# Patient Record
Sex: MALE | Race: WHITE | NOT HISPANIC OR LATINO
[De-identification: names, ages, dates, MRNs, and addresses within clinical notes are randomized per-mention and may not be internally consistent; named-entity substitution may affect disease eponyms.]

---

## 2018-11-12 ENCOUNTER — RECORD ABSTRACTING (OUTPATIENT)
Age: 43
End: 2018-11-12

## 2018-11-12 DIAGNOSIS — Z83.71 FAMILY HISTORY OF COLONIC POLYPS: ICD-10-CM

## 2018-11-12 DIAGNOSIS — Z82.49 FAMILY HISTORY OF ISCHEMIC HEART DISEASE AND OTHER DISEASES OF THE CIRCULATORY SYSTEM: ICD-10-CM

## 2018-11-12 DIAGNOSIS — Z86.010 PERSONAL HISTORY OF COLONIC POLYPS: ICD-10-CM

## 2018-11-12 DIAGNOSIS — Z83.3 FAMILY HISTORY OF DIABETES MELLITUS: ICD-10-CM

## 2018-11-12 DIAGNOSIS — Z87.891 PERSONAL HISTORY OF NICOTINE DEPENDENCE: ICD-10-CM

## 2018-11-12 PROBLEM — Z00.00 ENCOUNTER FOR PREVENTIVE HEALTH EXAMINATION: Status: ACTIVE | Noted: 2018-11-12

## 2018-12-18 ENCOUNTER — APPOINTMENT (OUTPATIENT)
Dept: CARDIOLOGY | Facility: CLINIC | Age: 43
End: 2018-12-18
Payer: COMMERCIAL

## 2018-12-18 ENCOUNTER — NON-APPOINTMENT (OUTPATIENT)
Age: 43
End: 2018-12-18

## 2018-12-18 VITALS
BODY MASS INDEX: 27.9 KG/M2 | DIASTOLIC BLOOD PRESSURE: 80 MMHG | HEART RATE: 50 BPM | HEIGHT: 72 IN | SYSTOLIC BLOOD PRESSURE: 125 MMHG | WEIGHT: 206 LBS

## 2018-12-18 PROCEDURE — 93000 ELECTROCARDIOGRAM COMPLETE: CPT

## 2018-12-18 PROCEDURE — 99214 OFFICE O/P EST MOD 30 MIN: CPT

## 2018-12-18 NOTE — HISTORY OF PRESENT ILLNESS
[FreeTextEntry1] : Scooter was first seen in 1997 for paroxysmal atrial fibrillation, ultimately had ablation of AVNRT  july 2013 by Dr Oliver.Since our visit last year he has not been hospitalized, he did have one urgent care visit for a cold. He denies chest pain, dyspnea at rest, palpitations or syncope there is dyspnea with "hard" exertion. He exercises 3-4 times per week. normal...

## 2020-01-09 ENCOUNTER — APPOINTMENT (OUTPATIENT)
Dept: CARDIOLOGY | Facility: CLINIC | Age: 45
End: 2020-01-09
Payer: COMMERCIAL

## 2020-01-09 ENCOUNTER — NON-APPOINTMENT (OUTPATIENT)
Age: 45
End: 2020-01-09

## 2020-01-09 ENCOUNTER — OTHER (OUTPATIENT)
Age: 45
End: 2020-01-09

## 2020-01-09 VITALS
DIASTOLIC BLOOD PRESSURE: 90 MMHG | BODY MASS INDEX: 28.31 KG/M2 | SYSTOLIC BLOOD PRESSURE: 120 MMHG | HEIGHT: 72 IN | WEIGHT: 209 LBS | HEART RATE: 49 BPM

## 2020-01-09 VITALS — SYSTOLIC BLOOD PRESSURE: 130 MMHG | DIASTOLIC BLOOD PRESSURE: 100 MMHG

## 2020-01-09 DIAGNOSIS — Z82.49 FAMILY HISTORY OF ISCHEMIC HEART DISEASE AND OTHER DISEASES OF THE CIRCULATORY SYSTEM: ICD-10-CM

## 2020-01-09 PROCEDURE — 93000 ELECTROCARDIOGRAM COMPLETE: CPT

## 2020-01-09 PROCEDURE — 99215 OFFICE O/P EST HI 40 MIN: CPT

## 2020-01-09 NOTE — HISTORY OF PRESENT ILLNESS
[FreeTextEntry1] : Scooter was first seen in 1997 for paroxysmal atrial fibrillation, ultimately had ablation of AVNRT and cardioversion of afib during the procedure   july 2013 by Dr Oliver.\par If he has "a couple of drinks" and walks upstairs he feels. short of breath. When he walks the dog he "gets a head rush".\par He does the stairmaster and walks, elliptical and bike  4-5  times /week  without difficulty.\par He denies chest pain,, palpitations or syncope.\par He denies Pnd or orthopnea.\par

## 2020-06-18 ENCOUNTER — RESULT REVIEW (OUTPATIENT)
Age: 45
End: 2020-06-18

## 2020-06-23 ENCOUNTER — APPOINTMENT (OUTPATIENT)
Dept: CARDIOLOGY | Facility: CLINIC | Age: 45
End: 2020-06-23
Payer: COMMERCIAL

## 2020-06-23 PROCEDURE — 99214 OFFICE O/P EST MOD 30 MIN: CPT | Mod: 95

## 2020-06-23 NOTE — REASON FOR VISIT
[Follow-Up - Clinic] : a clinic follow-up of [Atrial Fibrillation] : atrial fibrillation [FreeTextEntry2] : 6 months [FreeTextEntry1] : dimple

## 2020-06-23 NOTE — HISTORY OF PRESENT ILLNESS
[Home] : at home, [unfilled] , at the time of the visit. [Other Location: e.g. Home (Enter Location, City,State)___] : at [unfilled] [Spouse] : spouse [Verbal consent obtained from patient] : the patient, [unfilled] [FreeTextEntry1] : Scooter was first seen in 1997 for paroxysmal atrial fibrillation, ultimately had ablation of AVNRT and cardioversion of afib during the procedure   july 2013 by Dr Oliver.\par His echo showed harley, MRI confirms HOCM.\par He denies chest pain, dyspnea, palpitations or syncope.\par During the pandemic he has been exercising more and eating better  and has lost weight.

## 2020-06-29 ENCOUNTER — RESULT REVIEW (OUTPATIENT)
Age: 45
End: 2020-06-29

## 2020-06-30 ENCOUNTER — LABORATORY RESULT (OUTPATIENT)
Age: 45
End: 2020-06-30

## 2020-07-02 LAB
SARS-COV-2 IGG SERPL IA-ACNC: <0.1 INDEX
SARS-COV-2 IGG SERPL QL IA: NEGATIVE

## 2020-12-17 ENCOUNTER — RESULT CHARGE (OUTPATIENT)
Age: 45
End: 2020-12-17

## 2020-12-18 ENCOUNTER — APPOINTMENT (OUTPATIENT)
Dept: CARDIOLOGY | Facility: CLINIC | Age: 45
End: 2020-12-18
Payer: COMMERCIAL

## 2020-12-18 VITALS
HEIGHT: 72 IN | SYSTOLIC BLOOD PRESSURE: 120 MMHG | DIASTOLIC BLOOD PRESSURE: 80 MMHG | WEIGHT: 180 LBS | BODY MASS INDEX: 24.38 KG/M2 | HEART RATE: 50 BPM

## 2020-12-18 PROCEDURE — 99214 OFFICE O/P EST MOD 30 MIN: CPT

## 2020-12-18 PROCEDURE — 93000 ELECTROCARDIOGRAM COMPLETE: CPT

## 2020-12-18 PROCEDURE — 99072 ADDL SUPL MATRL&STAF TM PHE: CPT

## 2020-12-18 NOTE — HISTORY OF PRESENT ILLNESS
[FreeTextEntry1] : Scooter was first seen in 1997 for paroxysmal atrial fibrillation, ultimately had ablation of AVNRT and cardioversion of afib during the procedure   july 2013 by Dr Oliver.\par His echo showed harley, MRI confirms HOCM.\par He denies chest pain, dyspnea, palpitations or syncope.\par During the pandemic he has been exercising more and eating better  and has lost weight.

## 2020-12-18 NOTE — CARDIOLOGY SUMMARY
[No Ischemia] : no Ischemia [LVEF ___%] : LVEF [unfilled]% [___] : [unfilled] [Enlarged] : enlarged LA size [___] : [unfilled]

## 2021-12-18 ENCOUNTER — NON-APPOINTMENT (OUTPATIENT)
Age: 46
End: 2021-12-18

## 2021-12-20 ENCOUNTER — APPOINTMENT (OUTPATIENT)
Dept: CARDIOLOGY | Facility: CLINIC | Age: 46
End: 2021-12-20
Payer: COMMERCIAL

## 2021-12-20 VITALS
OXYGEN SATURATION: 98 % | WEIGHT: 178 LBS | SYSTOLIC BLOOD PRESSURE: 138 MMHG | DIASTOLIC BLOOD PRESSURE: 85 MMHG | HEIGHT: 72 IN | BODY MASS INDEX: 24.11 KG/M2 | HEART RATE: 54 BPM | TEMPERATURE: 97.7 F

## 2021-12-20 VITALS — SYSTOLIC BLOOD PRESSURE: 120 MMHG | DIASTOLIC BLOOD PRESSURE: 80 MMHG

## 2021-12-20 PROCEDURE — 99214 OFFICE O/P EST MOD 30 MIN: CPT

## 2021-12-20 PROCEDURE — 93000 ELECTROCARDIOGRAM COMPLETE: CPT

## 2021-12-20 NOTE — HISTORY OF PRESENT ILLNESS
[FreeTextEntry1] : Scooter was first seen in 1997 for paroxysmal atrial fibrillation, ultimately had ablation of AVNRT and cardioversion of afib during the procedure   july 2013 by Dr Oliver.\par His echo showed harley, MRI confirms HOCM.\par \par He had one er visit for a wrist injury.\par \par He denies chest pain, dyspnea, palpitations or syncope.\par He exercises 6-7 times a week, indoor bike, weightlifting and elliptical, without difficulty.\par He has had  some work stress.

## 2022-04-18 ENCOUNTER — NON-APPOINTMENT (OUTPATIENT)
Age: 47
End: 2022-04-18

## 2022-04-18 DIAGNOSIS — Z82.49 FAMILY HISTORY OF ISCHEMIC HEART DISEASE AND OTHER DISEASES OF THE CIRCULATORY SYSTEM: ICD-10-CM

## 2022-04-18 DIAGNOSIS — Z80.51 FAMILY HISTORY OF MALIGNANT NEOPLASM OF KIDNEY: ICD-10-CM

## 2022-05-09 ENCOUNTER — APPOINTMENT (OUTPATIENT)
Dept: CARDIOLOGY | Facility: CLINIC | Age: 47
End: 2022-05-09
Payer: COMMERCIAL

## 2022-05-09 PROCEDURE — 93306 TTE W/DOPPLER COMPLETE: CPT

## 2022-05-17 ENCOUNTER — APPOINTMENT (OUTPATIENT)
Dept: GASTROENTEROLOGY | Facility: CLINIC | Age: 47
End: 2022-05-17
Payer: COMMERCIAL

## 2022-05-17 VITALS
DIASTOLIC BLOOD PRESSURE: 78 MMHG | BODY MASS INDEX: 24.24 KG/M2 | SYSTOLIC BLOOD PRESSURE: 132 MMHG | HEART RATE: 76 BPM | WEIGHT: 179 LBS | HEIGHT: 72 IN

## 2022-05-17 PROCEDURE — 99204 OFFICE O/P NEW MOD 45 MIN: CPT

## 2022-05-17 NOTE — ASSESSMENT
[FreeTextEntry1] : \par \par 1. Hemorrhoids:  well - controlled.  No pain,  swelling,  itch,  bleeding\par * Discussed   the  potential complications of thrombosis,  pain,  infection,  swelling, itching,  bleeding --none recently\par Recommendations: \par * Moderate- High  Fiber Diet was reviewed and emphasized\par * 6  --  8 cups of decaffeinated fluid daily was emphasized \par * Sitz Bathes as needed ,  No:  Anusol HC  Suppos / Cream  TN BID -- was needed\par * No:  Tucks BID,  Balneol Lotion,   Calmoseptine Oint -- was needed ;    can use  Prep H prn\par * No:  need for  Colorectal surgical evaluation for possible ablation \par \par \par \par \par \par \par 2. Constipation:   No pain,  constipation,  diarrhea,  bloat\par Recommend: \par * Diet: moderate -High Fiber,  Low Fat & Lactose free, Low FODMAPs--was reviewed & emphasized\par * Daily fluid intake was reviewed : 6  --  8 cups of decaffeinated fluid daily was emphasized\par * Regular aerobic exercise was emphasized\par * Probiotics  1  daily\par * Miralax 1/2 cap  prn \par * Neuromodulation:  not currently required\par \par \par \par \par \par 3. Colorectal   Neoplasia  Screening:  to be evaluated\par   Utilizing teaching posters and anatomical models the following were discussed and emphasized with the patient in detail: \par * Discussed the pre-malignant potential of polyps\par * Discussed the importance of f/u surveillance / screening colonoscopy \par * moderate-High  Fiber Diet was reviewed and emphasized\par * Anti-oxidants and ASA/NSAID Therapy emphasized\par * Given age > 40-49 yo & +PH/+FH Colon Polyps \par * Recommend Colonoscopy  to  R/O  Colonic Neoplasia-- in 2022\par \par \par \par \par \par Informed Consent:\par * The risks & Benefits of   Colonoscopy were discussed w patient.\par * This included but was not limited to perforation, bleeding, sedation /med rxns possibly requiring surgery, blood transfusions, antibiotics & CPR/Intubation.\par * Pt. understands & agrees to the procedures.\par The following instructions in regards to the prep and medically essential ( cardiac, pulmonary, sz, psych, endocrine)  pre-op medication administration\par was reviewed and emphasized with the patient . \par * Pt. advised to D/C  ASA/NSAIDs  7  Days  PTP.\par * [ +++ ]  Dulcolax / Miralax / Mag. Citrate ,  [     ] Prepopik/ Clenpiq ,  [     ] Osmo Prep,  [    ] GoLytely,  prep. reviewed w Pt.\par * Hold  [           ] AM of procedure.\par * Hold  [           ] PM  before procedure.\par * Take  [           ] PM  before procedure.\par * Take  [           ] AM of procedure.\par \par

## 2022-05-17 NOTE — HISTORY OF PRESENT ILLNESS
[de-identified] : \par \par This HPI  reflects a summary and review of records : including previous and most recent  Labs, body imaging, consults and progress notes, operative and pathology reports, EKG reports, ED records, found in [facility], [facility],  [facility] and any additional records brought in by  the patient at the time of the visit.\par \par \par PCP: January\par \par 45 yo M w h/o HOCM, AFIB/SVT --s/p ablation, HTN, HLD, \par + H/O Snoring,  BMI=24   ,   neck= 17  ,  STOP-Bang= 3    ,  Mallaampatti=4\par Colon Polyps, +FH Colon Polyps,Hemorrhoids\par \par 5/3/22    Today:  Feeling well, no c/o , CP, SOB/ DIAMOND, Cough, Wheeze, Palpitations, edema\par \par   Today: doing well, eating HF diet, rare constipation\par              No recent bleeding \par \par * Abd pain-->no\par * Nausea--> no\par * Vomit--> no\par * Early satiety--> no\par * Belching--> no\par * Hiccups--> no\par * Regurgitation--> no\par * Acid Taste / Water Brash--> no\par * Ht burn--> no\par * Dysphagia--> no\par * Throat Clearing--> no\par * Hoarseness--> no\par * Post-Nasal Drip--> no\par * Congestion--> no\par * Globus--> no\par * Cough--> no\par * Wheeze / PC-> -no\par * BMs: # 4 qd\par * Constipation--> no\par * Diarrhea--> no\par * Bloating--> no\par * Strain on Defecation--> no\par * Incompl Evac--> no\par * Flatulence--> no\par * Gurgling--> no\par * Melena--> no\par * BPBPR-> -no\par * Anorexia--> no\par * Wt. Loss--> no\par \par \par

## 2022-08-16 ENCOUNTER — RESULT REVIEW (OUTPATIENT)
Age: 47
End: 2022-08-16

## 2022-08-19 ENCOUNTER — APPOINTMENT (OUTPATIENT)
Dept: GASTROENTEROLOGY | Facility: HOSPITAL | Age: 47
End: 2022-08-19

## 2022-12-06 ENCOUNTER — NON-APPOINTMENT (OUTPATIENT)
Age: 47
End: 2022-12-06

## 2022-12-06 DIAGNOSIS — K64.8 OTHER HEMORRHOIDS: ICD-10-CM

## 2022-12-06 DIAGNOSIS — K59.00 CONSTIPATION, UNSPECIFIED: ICD-10-CM

## 2022-12-07 ENCOUNTER — APPOINTMENT (OUTPATIENT)
Dept: CARDIOLOGY | Facility: CLINIC | Age: 47
End: 2022-12-07

## 2022-12-07 ENCOUNTER — NON-APPOINTMENT (OUTPATIENT)
Age: 47
End: 2022-12-07

## 2022-12-07 VITALS
SYSTOLIC BLOOD PRESSURE: 132 MMHG | BODY MASS INDEX: 24.79 KG/M2 | DIASTOLIC BLOOD PRESSURE: 84 MMHG | HEIGHT: 72 IN | TEMPERATURE: 98.6 F | WEIGHT: 183 LBS | HEART RATE: 57 BPM | RESPIRATION RATE: 16 BRPM | OXYGEN SATURATION: 98 %

## 2022-12-07 PROCEDURE — 99214 OFFICE O/P EST MOD 30 MIN: CPT | Mod: 25

## 2022-12-07 PROCEDURE — 93000 ELECTROCARDIOGRAM COMPLETE: CPT

## 2022-12-07 NOTE — HISTORY OF PRESENT ILLNESS
[FreeTextEntry1] : Scooter was first seen in 1997 for paroxysmal atrial fibrillation, ultimately had ablation of AVNRT and cardioversion of afib during the procedure   july 2013 by Dr Oliver.\par His echo showed harley, MRI confirms HOCM.\par Had COVID in September treated with Paxlovid.\par \par He denies chest pain, dyspnea, palpitations or syncope.\par He exercises 6-7 times a week, indoor bike, weightlifting and elliptical, without difficulty.\par \par He drinks about 816 ounce beer a week Dr. Sin noticed that his bilirubin is mildly elevated.  He also notes that he has been eating some fast food in the car pizza, sandwiches with cold cuts

## 2022-12-28 ENCOUNTER — TRANSCRIPTION ENCOUNTER (OUTPATIENT)
Age: 47
End: 2022-12-28

## 2022-12-28 ENCOUNTER — RESULT REVIEW (OUTPATIENT)
Age: 47
End: 2022-12-28

## 2022-12-29 ENCOUNTER — TRANSCRIPTION ENCOUNTER (OUTPATIENT)
Age: 47
End: 2022-12-29

## 2023-01-31 ENCOUNTER — TRANSCRIPTION ENCOUNTER (OUTPATIENT)
Age: 48
End: 2023-01-31

## 2023-02-03 ENCOUNTER — TRANSCRIPTION ENCOUNTER (OUTPATIENT)
Age: 48
End: 2023-02-03

## 2023-02-09 ENCOUNTER — TRANSCRIPTION ENCOUNTER (OUTPATIENT)
Age: 48
End: 2023-02-09

## 2023-02-28 ENCOUNTER — TRANSCRIPTION ENCOUNTER (OUTPATIENT)
Age: 48
End: 2023-02-28

## 2023-03-01 ENCOUNTER — TRANSCRIPTION ENCOUNTER (OUTPATIENT)
Age: 48
End: 2023-03-01

## 2023-08-21 ENCOUNTER — TRANSCRIPTION ENCOUNTER (OUTPATIENT)
Age: 48
End: 2023-08-21

## 2023-08-24 ENCOUNTER — LABORATORY RESULT (OUTPATIENT)
Age: 48
End: 2023-08-24

## 2023-08-25 ENCOUNTER — TRANSCRIPTION ENCOUNTER (OUTPATIENT)
Age: 48
End: 2023-08-25

## 2023-08-31 ENCOUNTER — TRANSCRIPTION ENCOUNTER (OUTPATIENT)
Age: 48
End: 2023-08-31

## 2023-09-01 ENCOUNTER — TRANSCRIPTION ENCOUNTER (OUTPATIENT)
Age: 48
End: 2023-09-01

## 2023-12-09 ENCOUNTER — NON-APPOINTMENT (OUTPATIENT)
Age: 48
End: 2023-12-09

## 2023-12-11 ENCOUNTER — NON-APPOINTMENT (OUTPATIENT)
Age: 48
End: 2023-12-11

## 2023-12-11 ENCOUNTER — APPOINTMENT (OUTPATIENT)
Dept: CARDIOLOGY | Facility: CLINIC | Age: 48
End: 2023-12-11
Payer: COMMERCIAL

## 2023-12-11 VITALS
HEART RATE: 53 BPM | WEIGHT: 181 LBS | DIASTOLIC BLOOD PRESSURE: 82 MMHG | HEIGHT: 72 IN | BODY MASS INDEX: 24.52 KG/M2 | OXYGEN SATURATION: 97 % | SYSTOLIC BLOOD PRESSURE: 116 MMHG

## 2023-12-11 DIAGNOSIS — E78.00 PURE HYPERCHOLESTEROLEMIA, UNSPECIFIED: ICD-10-CM

## 2023-12-11 DIAGNOSIS — I48.0 PAROXYSMAL ATRIAL FIBRILLATION: ICD-10-CM

## 2023-12-11 DIAGNOSIS — Z98.890 OTHER SPECIFIED POSTPROCEDURAL STATES: ICD-10-CM

## 2023-12-11 DIAGNOSIS — Z78.9 OTHER SPECIFIED HEALTH STATUS: ICD-10-CM

## 2023-12-11 DIAGNOSIS — I42.1 OBSTRUCTIVE HYPERTROPHIC CARDIOMYOPATHY: ICD-10-CM

## 2023-12-11 DIAGNOSIS — I47.19 OTHER SUPRAVENTRICULAR TACHYCARDIA: ICD-10-CM

## 2023-12-11 PROCEDURE — 99214 OFFICE O/P EST MOD 30 MIN: CPT | Mod: 25

## 2023-12-11 PROCEDURE — 93000 ELECTROCARDIOGRAM COMPLETE: CPT

## 2023-12-18 ENCOUNTER — RX RENEWAL (OUTPATIENT)
Age: 48
End: 2023-12-18

## 2023-12-18 RX ORDER — ROSUVASTATIN CALCIUM 10 MG/1
10 TABLET, FILM COATED ORAL DAILY
Qty: 90 | Refills: 3 | Status: ACTIVE | COMMUNITY
Start: 2022-12-30 | End: 1900-01-01

## 2024-01-04 ENCOUNTER — LABORATORY RESULT (OUTPATIENT)
Age: 49
End: 2024-01-04

## 2024-01-26 ENCOUNTER — TRANSCRIPTION ENCOUNTER (OUTPATIENT)
Age: 49
End: 2024-01-26

## 2024-01-29 ENCOUNTER — TRANSCRIPTION ENCOUNTER (OUTPATIENT)
Age: 49
End: 2024-01-29

## 2024-05-03 ENCOUNTER — APPOINTMENT (OUTPATIENT)
Dept: CARDIOLOGY | Facility: CLINIC | Age: 49
End: 2024-05-03
Payer: COMMERCIAL

## 2024-05-03 ENCOUNTER — NON-APPOINTMENT (OUTPATIENT)
Age: 49
End: 2024-05-03

## 2024-05-03 VITALS
BODY MASS INDEX: 24.92 KG/M2 | SYSTOLIC BLOOD PRESSURE: 110 MMHG | HEIGHT: 72 IN | DIASTOLIC BLOOD PRESSURE: 71 MMHG | OXYGEN SATURATION: 98 % | WEIGHT: 184 LBS | HEART RATE: 56 BPM

## 2024-05-03 PROCEDURE — 99205 OFFICE O/P NEW HI 60 MIN: CPT | Mod: 25

## 2024-05-03 PROCEDURE — 93000 ELECTROCARDIOGRAM COMPLETE: CPT

## 2024-05-15 NOTE — REASON FOR VISIT
[FreeTextEntry1] : New referral (Dr. Daylin Stephens) for evaluation of obstructive hypertrophic cardiomyopathy.   Immediately prior to this office visit, i reviewed the echocardiogram & stress echocardiogram performed at [facility] on January 11, 2024. HCM is present, with a maximal wall thickness of 1.6 cm. Mr. Steward exercised for 13.1 minutes of the Azael protocol to a peak heart rate of 150/min. In recovery, at a heart rate of 83/min, the peak LVOT gradient was approximately 60 mmHg.   First had palpitations at the age of 13 years. Had an atrial fibrillation in July 2013. Cardiologist first noted LVH between five to seven years ago. Only medication is rosuvastatin 5 mg/d.   Mr. Steward is largely asymptomatic. He may occasionally experience some dyspnea after walking his dog (these walks are not on flat terrain). There is no history of palpitations or syncope.  There is no family history of any cardiomyopathy. The patient has one child.

## 2024-05-15 NOTE — PHYSICAL EXAM
[Normal Venous Pressure] : normal venous pressure [Normal S1, S2] : normal S1, S2 [Normal] : alert and oriented, normal memory [de-identified] : 1/6 mid-systolic murmur at the apex

## 2024-05-15 NOTE — REVIEW OF SYSTEMS
[Chest Discomfort] : no chest discomfort [Palpitations] : no palpitations [Syncope] : no syncope [Negative] : Neurological [FreeTextEntry5] : See HPI

## 2024-05-15 NOTE — ASSESSMENT
[FreeTextEntry1] : Impression: Obstructive HCM with objective evidence of an excellent exercise capacity on no medications related to HCM.  Suggest: Cardiac MRI Genetic counseling (this can be done via telehealth). For now, no compelling reason to start any medications.   Total time, including time spent reviewing previous imaging studies, 70 minutes.

## 2024-07-08 ENCOUNTER — TRANSCRIPTION ENCOUNTER (OUTPATIENT)
Age: 49
End: 2024-07-08

## 2024-08-08 ENCOUNTER — OUTPATIENT (OUTPATIENT)
Dept: OUTPATIENT SERVICES | Facility: HOSPITAL | Age: 49
LOS: 1 days | End: 2024-08-08
Payer: COMMERCIAL

## 2024-08-08 ENCOUNTER — RESULT REVIEW (OUTPATIENT)
Age: 49
End: 2024-08-08

## 2024-08-08 ENCOUNTER — APPOINTMENT (OUTPATIENT)
Dept: CARDIOLOGY | Facility: CLINIC | Age: 49
End: 2024-08-08

## 2024-08-08 ENCOUNTER — APPOINTMENT (OUTPATIENT)
Dept: CV DIAGNOSITCS | Facility: HOSPITAL | Age: 49
End: 2024-08-08

## 2024-08-08 DIAGNOSIS — I42.1 OBSTRUCTIVE HYPERTROPHIC CARDIOMYOPATHY: ICD-10-CM

## 2024-08-08 PROCEDURE — 93306 TTE W/DOPPLER COMPLETE: CPT

## 2024-08-08 PROCEDURE — 93306 TTE W/DOPPLER COMPLETE: CPT | Mod: 26

## 2024-09-18 ENCOUNTER — APPOINTMENT (OUTPATIENT)
Dept: CARDIOLOGY | Facility: CLINIC | Age: 49
End: 2024-09-18
Payer: COMMERCIAL

## 2024-09-18 DIAGNOSIS — Z82.49 FAMILY HISTORY OF ISCHEMIC HEART DISEASE AND OTHER DISEASES OF THE CIRCULATORY SYSTEM: ICD-10-CM

## 2024-09-18 DIAGNOSIS — Z86.79 PERSONAL HISTORY OF OTHER DISEASES OF THE CIRCULATORY SYSTEM: ICD-10-CM

## 2024-09-18 PROCEDURE — 96040: CPT

## 2024-09-18 PROCEDURE — 99215 OFFICE O/P EST HI 40 MIN: CPT

## 2024-09-18 NOTE — REASON FOR VISIT
[FreeTextEntry3] : Dear Dr. Stephens and Dr. Flor       . I saw your patient KATHARINA STILL on 09/18/2024 . Please see the note below for the assessment and plan.   KATHARINA STILL  was seen  for an initial consultation at the Cardiogenomics Program at [facility] on 09/18/2024.   Mr. STILL was referred by Dr. Stephens and Dr. Flor for hereditary cardiac predisposition risk assessment and counseling, due to HOCM and early afib

## 2024-09-18 NOTE — DISCUSSION/SUMMARY
[TextEntry] : We reviewed the risks, benefits, limitations, and implications of genetic testing.  Additionally, we examined the patients motivation for testing, and the emotional ramifications of the test results.  The patient is aware that results may impact family members.  Counseling resources are available if requested.   ANISH, the Genetic Information Non-discrimination Act protects most people from discrimination in health insurance and employment at firms with over 50 employees.  ANISH does not protect against use of genetic information by life insurance, disability, or long-term care insurers.  Further information on other limitations is available at [email or web address].   After a review of testing options, the patient elected to the combined cardiac panel offered through [facility]. . Panels contain 138 genes associated with varying levels of risk for cardiomyopathy and arrythmia. We reviewed the three possible results for each gene on this panel: positive, negative and variant of uncertain significance (VUS).  We discussed that panel testing could result in incidental findings, such as identifying a positive result in a gene with cardiac risks not seen in the current personal or discussed family history. If results are positive, we would discuss the  risks and management options associated with that cardiac condition susceptibility gene and discuss genetic testing for family members.  Pedigree was reviewed with the patient to identify those who should be tested if the patient is positive.  If results are negative or a VUS is identified, the patient would continue to be managed based on personal and family history of their  cardiac condition.

## 2024-09-18 NOTE — RESULTS/DATA
[TextEntry] : Impression:         1.  The left ventricle (LV) is normal in size. There is reverse curve asymmetric septal  hypertrophy. Maximal wall thickness is 2.7 cm n the mid inferoseptum.  Left ventricular global  systolic function is hyperdynamic. The LV ejection fraction is  80 %.    2.  The right ventricle (RV) is normal in size. RV global systolic function is normal. The RV  ejection fraction is 74 %.      3.  Systolic anterior motion of the mitral valve with flow acceleration across the LVOT. There is  moderate to severe regurgitation. Regurgitant volume is 63 mL and regurgitant fraction is 41% by  quantitative flow. By direct LV/RV stroke volume, regurgitant fraction is 36%.     4.  No significant abnormalities of the visualized portions of the great vessels.    5.  On delayed enhancement imaging,  there is fibrosis in RV insertion points which is seen in  hypertrophic cardiomyopathy.        The sequences used in this study were designed for imaging cardiac structures and are suboptimal  for imaging other structures and organs

## 2024-09-18 NOTE — SIGNATURES
[TextEntry] : Horace Juarez MD, PhD  Medical Director Program for Cardiac Genetics, Genomics and Precision Medicine Department of Cardiology [facility]  [employer] at [address] [place] [address] Tel: [phone] Fax: [phone]  ([facility] office) [address], 3rd floor (between [address]) [place] [address] (p) [phone] (f) [phone]

## 2024-09-18 NOTE — PLAN
[TextEntry] : 1.	Verbal  Consent obtained for the combined cardiac sequencing and Del/Dup panel 138 genes (#802)  . A consent form will be emailed to the patient and upon receipt of the signed consent form. .name  can go to any [facility] lab for a blood draw and should bring a copy of the signed form [email or web address].	 The blood sample will be sent to [facility] for analysis, pending insurance authorization.  3.	A follow-up appointment was scheduled in 2-3 months to discuss genetic testing results in person. Results generally return in 6-8 weeks.  For any additional questions please call  Jeni Horn MS, LALA or Horace Juarez MD, PhD at [phone] I spent 60 minutes on the [id number]  Patient seen with Jeni Horn MS, LALA, board certified genetic counsellor

## 2024-09-18 NOTE — HISTORY OF PRESENT ILLNESS
[Home] : at home, [unfilled] , at the time of the visit. [Medical Office: ([facility])___] : at the medical office located in  [Verbal consent obtained from patient] : the patient, [unfilled] [FreeTextEntry1] : KATHARINA STILL is a 50yo M PMH HOCM , max wall thickness 2.7cm on cMRI , obstructive gradient with exercise is 60mmHg. Afib, AVNRT.  hx palpitations 13 began at age 13,  no hx syncope. resolved spont later that day spont,. Would reoccur a few times a year occuring for hours. Diagnosed with Afib in his early 20s, s/p ablation 2013 LVH on EKG 7 years ago  today he  is referred for a cardiogenomic evaluation

## 2024-09-18 NOTE — FAMILY HISTORY
[FreeTextEntry1] : FamilyHistory_20_twCiteListControlStart FamilyHistory_20_twCiteListControlEnd [id number]  A four-generation family history was constructed and scanned into [facility].  Family history is significant for:  father dec 64 yo dec PE post surgery mother 72 yo, hx afib s/p watchman son 17 yo M, born with murmur   his maternal families originate from [place] and paternal families originate from  [place] .  +Ashkenazi Quaker ancestry.  Family history was negative for consanguinity   No family history of SIDS    [FreeTextEntry2] :  [place] [FreeTextEntry3] :  [place]

## 2024-09-18 NOTE — ASSESSMENT
[TextEntry] : KATHARINA STILL  is a 49 year M  with a history of  HOCM , max wall thickness 2.7cm on cMRI , obstructive gradient with exercise is 60mmHg. Afib, AVNRT. Early onset Afib diagnosed in his 20s.  Symptomatic palpitations since age 13.. The differences between hereditary and sporadic cardiomyopathy and arrythmia were reviewed with the patient.  He  has elected to undergo genetic testing due to concerns for  personal history, definitive diagnosis, disease management, family history, concern for the health of family members . Results may change medical management for the patient and may affect the healthcare of other family members. He  expressed understanding of the presented information and satisfaction with having all of His questions and concerns addressed

## 2024-09-19 NOTE — ASSESSMENT
[TextEntry] : KATHARINA STILL is a 48yo M PMH of HOCM, max wall thickness 2.7cm on cMRI , obstructive gradient with exercise is 60mmHg. Afib, AVNRT.  The differences between hereditary and sporadic cardiomyopathy and arrhythmias were reviewed with the patient. He has elected to undergo genetic testing due to concerns for personal history, definitive diagnosis, disease management, family history, concern for the health of family members. Results may change medical management for the patient and may affect the healthcare of other family members. Patient expressed understanding of the presented information and satisfaction with having all of His questions and concerns addressed. [facility] Combined cardiac panel will be ordered today.

## 2024-09-19 NOTE — DISCUSSION/SUMMARY
[TextEntry] : KATHARINA STILL is a 50yo M PMH of HOCM, max wall thickness 2.7cm on cMRI , obstructive gradient with exercise is 60mmHg. Afib, AVNRT.  The differences between hereditary and sporadic cardiomyopathy and arrhythmia were reviewed with the patient. Identifying genetic causes of His condition may change medical management for the patient and may affect the healthcare of other family members. We discussed genetic tests that we could perform to address the possible genetic causes of his PMH of HOCM.   We also reviewed the risks, benefits, limitations, and implications of genetic testing.  After a review of testing options, the patient elected to undergo testing for [facility] Combined Cardiac Sequencing and Del/Dup Panel (test code 935), which analyzes 138 genes that are associated with varying levels of risk for cardiomyopathy and arrhythmia. We reviewed the three possible results for each gene on this panel: positive, negative and variant of uncertain significance (VUS).  If the results are positive, we would discuss the risks and management options associated with that susceptibility gene and discuss genetic testing for family members.  Pedigree was reviewed with the patient to identify those who should be tested if the patient is positive.  If results are negative or a VUS is identified, the patient would continue to be managed based on the personal hx of HOCM.   Patient expressed understanding of the presented information and satisfaction with having all of His questions and concerns addressed.   We will follow up once the results are in.

## 2024-09-19 NOTE — HISTORY OF PRESENT ILLNESS
[TextEntry] : KATHARINA STILL is a 50yo M PMH HOCM , max wall thickness 2.7cm on cMRI, obstructive gradient with exercise is 60mmHg. Afib, AVNRT.  First had palpitations at the age of 13 years. Had an atrial fibrillation in July 2013. Cardiologist first noted LVH between five to seven years ago. s/p ablation 2013  5/3/2024 ECG: Sinus @ 57/min. Left axis deviation.  Patient presents today for cardiogenomics evaluation.

## 2024-09-19 NOTE — FAMILY HISTORY
[TextEntry] : Three generation family history was constructed and scanned into EMR. His maternal family history is positive for mother with hx of Afib. . His/her paternal history is negative for any significant medical conditions.   KATHARINA STILL   has two healthy siblings.   The family history was negative for the presence of sudden death or other significant cardiac findings, known genetic disorders or consanguinity

## 2024-09-19 NOTE — PLAN
[TextEntry] : 1. Informed Consent was obtained for the  [facility] Combined Cardiac Panel (test code #935) 2. A buccal swab sample collection kit will be sent to the patient's residence to collect the sample and send it to [facility] for analysis, pending insurance authorization. 3. A follow-up appointment was scheduled in 2 months to discuss genetic testing results. Results generally return in 4-6 weeks.   For any additional questions, please call Kory Gan, MS, LALA, Cardiogenomic Program, at [phone]  Jeni Horn, MS, [facility] Assistant Chief, Pediatric Cardiogenomics, [facility]  in the Departments of Pediatrics and Cardiology, [facility] [employer], [facility]

## 2024-11-14 ENCOUNTER — TRANSCRIPTION ENCOUNTER (OUTPATIENT)
Age: 49
End: 2024-11-14

## 2024-11-18 ENCOUNTER — APPOINTMENT (OUTPATIENT)
Dept: CARDIOLOGY | Facility: CLINIC | Age: 49
End: 2024-11-18

## 2024-11-18 ENCOUNTER — APPOINTMENT (OUTPATIENT)
Dept: CARDIOLOGY | Facility: CLINIC | Age: 49
End: 2024-11-18
Payer: COMMERCIAL

## 2024-11-18 ENCOUNTER — RESULT CHARGE (OUTPATIENT)
Age: 49
End: 2024-11-18

## 2024-11-18 ENCOUNTER — NON-APPOINTMENT (OUTPATIENT)
Age: 49
End: 2024-11-18

## 2024-11-18 VITALS
WEIGHT: 183 LBS | HEIGHT: 72 IN | SYSTOLIC BLOOD PRESSURE: 118 MMHG | DIASTOLIC BLOOD PRESSURE: 80 MMHG | OXYGEN SATURATION: 98 % | HEART RATE: 60 BPM | BODY MASS INDEX: 24.79 KG/M2

## 2024-11-18 DIAGNOSIS — I42.1 OBSTRUCTIVE HYPERTROPHIC CARDIOMYOPATHY: ICD-10-CM

## 2024-11-18 DIAGNOSIS — I48.0 PAROXYSMAL ATRIAL FIBRILLATION: ICD-10-CM

## 2024-11-18 DIAGNOSIS — E78.00 PURE HYPERCHOLESTEROLEMIA, UNSPECIFIED: ICD-10-CM

## 2024-11-18 PROCEDURE — 99214 OFFICE O/P EST MOD 30 MIN: CPT | Mod: 25

## 2024-11-18 PROCEDURE — 93000 ELECTROCARDIOGRAM COMPLETE: CPT

## 2024-11-19 ENCOUNTER — TRANSCRIPTION ENCOUNTER (OUTPATIENT)
Age: 49
End: 2024-11-19

## 2024-11-19 LAB
ALBUMIN SERPL ELPH-MCNC: 4.7 G/DL
ALP BLD-CCNC: 79 U/L
ALT SERPL-CCNC: 32 U/L
ANION GAP SERPL CALC-SCNC: 12 MMOL/L
AST SERPL-CCNC: 30 U/L
BILIRUB SERPL-MCNC: 0.8 MG/DL
BUN SERPL-MCNC: 19 MG/DL
CALCIUM SERPL-MCNC: 10 MG/DL
CHLORIDE SERPL-SCNC: 105 MMOL/L
CHOLEST SERPL-MCNC: 151 MG/DL
CO2 SERPL-SCNC: 25 MMOL/L
CREAT SERPL-MCNC: 1.19 MG/DL
EGFR: 75 ML/MIN/1.73M2
GLUCOSE SERPL-MCNC: 78 MG/DL
HDLC SERPL-MCNC: 81 MG/DL
LDLC SERPL CALC-MCNC: 61 MG/DL
NONHDLC SERPL-MCNC: 71 MG/DL
POTASSIUM SERPL-SCNC: 5.3 MMOL/L
PROT SERPL-MCNC: 7.1 G/DL
SODIUM SERPL-SCNC: 143 MMOL/L
TRIGL SERPL-MCNC: 43 MG/DL

## 2024-11-20 ENCOUNTER — TRANSCRIPTION ENCOUNTER (OUTPATIENT)
Age: 49
End: 2024-11-20

## 2024-11-22 ENCOUNTER — TRANSCRIPTION ENCOUNTER (OUTPATIENT)
Age: 49
End: 2024-11-22

## 2024-11-27 ENCOUNTER — APPOINTMENT (OUTPATIENT)
Dept: CARDIOLOGY | Facility: CLINIC | Age: 49
End: 2024-11-27
Payer: COMMERCIAL

## 2024-11-27 ENCOUNTER — TRANSCRIPTION ENCOUNTER (OUTPATIENT)
Age: 49
End: 2024-11-27

## 2024-11-27 PROCEDURE — 99215 OFFICE O/P EST HI 40 MIN: CPT

## 2024-12-06 ENCOUNTER — RX RENEWAL (OUTPATIENT)
Age: 49
End: 2024-12-06

## 2024-12-16 ENCOUNTER — APPOINTMENT (OUTPATIENT)
Dept: CARDIOLOGY | Facility: CLINIC | Age: 49
End: 2024-12-16

## 2025-01-06 ENCOUNTER — TRANSCRIPTION ENCOUNTER (OUTPATIENT)
Age: 50
End: 2025-01-06

## 2025-01-08 ENCOUNTER — TRANSCRIPTION ENCOUNTER (OUTPATIENT)
Age: 50
End: 2025-01-08

## 2025-01-09 ENCOUNTER — TRANSCRIPTION ENCOUNTER (OUTPATIENT)
Age: 50
End: 2025-01-09

## 2025-01-15 ENCOUNTER — TRANSCRIPTION ENCOUNTER (OUTPATIENT)
Age: 50
End: 2025-01-15

## 2025-01-16 ENCOUNTER — TRANSCRIPTION ENCOUNTER (OUTPATIENT)
Age: 50
End: 2025-01-16

## 2025-05-05 ENCOUNTER — RESULT REVIEW (OUTPATIENT)
Age: 50
End: 2025-05-05

## 2025-05-05 ENCOUNTER — APPOINTMENT (OUTPATIENT)
Dept: CV DIAGNOSTICS | Facility: HOSPITAL | Age: 50
End: 2025-05-05

## 2025-05-06 ENCOUNTER — TRANSCRIPTION ENCOUNTER (OUTPATIENT)
Age: 50
End: 2025-05-06

## 2025-05-07 ENCOUNTER — TRANSCRIPTION ENCOUNTER (OUTPATIENT)
Age: 50
End: 2025-05-07

## 2025-05-13 ENCOUNTER — APPOINTMENT (OUTPATIENT)
Dept: SURGERY | Facility: CLINIC | Age: 50
End: 2025-05-13
Payer: COMMERCIAL

## 2025-05-13 VITALS
BODY MASS INDEX: 26.57 KG/M2 | HEIGHT: 72 IN | SYSTOLIC BLOOD PRESSURE: 125 MMHG | HEART RATE: 61 BPM | WEIGHT: 196.2 LBS | OXYGEN SATURATION: 98 % | DIASTOLIC BLOOD PRESSURE: 75 MMHG | TEMPERATURE: 97.7 F

## 2025-05-13 PROCEDURE — 99204 OFFICE O/P NEW MOD 45 MIN: CPT

## 2025-05-13 RX ORDER — ROSUVASTATIN CALCIUM 5 MG/1
5 TABLET, FILM COATED ORAL DAILY
Refills: 0 | Status: ACTIVE | COMMUNITY